# Patient Record
Sex: FEMALE | Race: WHITE | Employment: PART TIME | URBAN - METROPOLITAN AREA
[De-identification: names, ages, dates, MRNs, and addresses within clinical notes are randomized per-mention and may not be internally consistent; named-entity substitution may affect disease eponyms.]

---

## 2022-06-27 ENCOUNTER — OFFICE VISIT (OUTPATIENT)
Dept: SURGERY | Facility: CLINIC | Age: 47
End: 2022-06-27
Payer: MEDICAID

## 2022-06-27 VITALS — SYSTOLIC BLOOD PRESSURE: 112 MMHG | HEART RATE: 120 BPM | DIASTOLIC BLOOD PRESSURE: 64 MMHG

## 2022-06-27 DIAGNOSIS — A69.20 LYME DISEASE: ICD-10-CM

## 2022-06-27 DIAGNOSIS — M79.10 MYALGIA: Primary | ICD-10-CM

## 2022-06-27 DIAGNOSIS — R20.0 NUMBNESS: ICD-10-CM

## 2022-06-27 RX ORDER — METHYLPREDNISOLONE 4 MG/1
TABLET ORAL
Qty: 1 EACH | Refills: 0 | Status: SHIPPED | OUTPATIENT
Start: 2022-06-27

## 2022-06-27 RX ORDER — GABAPENTIN 400 MG/1
1 CAPSULE ORAL 3 TIMES DAILY
COMMUNITY
Start: 2021-12-16 | End: 2022-12-16

## 2022-06-27 RX ORDER — ACETAMINOPHEN 325 MG/1
650 TABLET ORAL
COMMUNITY
Start: 2022-06-05

## 2022-06-27 RX ORDER — ASPIRIN 325 MG
325 TABLET ORAL DAILY
COMMUNITY

## 2022-06-27 NOTE — H&P
Neurosurgery Clinic Visit  2022    [facility] PCP:  None Pcp    1975 MRN [id number]       CC:  Myalgias    HPI:    Phyllis Marrero is a very pleasant 52year old female who has had diffuse myalgias and paresthesias since she was sick with lyme disease 18 months ago. She is crying. Apparently she has been admitted to [facility] [id number] in [place] but I have sporadic records available which refer to history of alcohol and cocaine abuse. She has pain, tightness, sensitivity/numbness throughout her entire body from the neck down. She was taking gabapentin but not helping so she takes it rarely. She is not taking any other meds for pain. She is in a wheelchair because any movement hurts her. She is not eating well and given Pedialyte. She denies hx of MI, CVA, CA, or complications from surgery or anesthesia. She couldn't get an MRI as she kept getting spasms. She denies fever or chills. Imaging:    None available. No past medical history on file. Social History    Socioeconomic History      Marital status:     Tobacco Use      Smoking status: Current Every Day Smoker      Smokeless tobacco: Never Used    No family history on file. ROS:  A 10-point system was reviewed. Pertinent positives and negatives are noted in HPI. Physical Exam:   22  0937   BP: 112/64   Pulse: 120   General: In moderate distress crying, Well Nourished, Well Developed, Normal Voice, Appears Stated Age  HEENT: No Scleral Icterus, Poor Dentition, No Facial Asymmetry  Integumentary: Skin intact, no onychomycosis, no clubbing. A detailed skin exam was not performed. Neurologic: Alert and Oriented x 3, CN 2-12 GI, Speech Fluent, Negative Pronator Drift, Finger-to-Nose Intact, SILT with hypesthesias, DTRs equally diminished without gerhard's or clonus  Strength exam required repeated prompts, patient saying she couldn't do it but would be able to with encourage. Movement was causing pain. Deltoid Biceps Triceps Wrist Extension  Finger Abduction Finger Extension Thumb Opposition   Right 5 5 5 5 5 5 5 5   Left 5 5 5 5 5 5 5 5   Lower extremity strength:    Iliopsoas Quad Hamstring D-Flexion EHL P-Flexion Eversion Inversion   Right 5 5 5 5 5 5 5 5   Left 5 5 5 5 5 5 5 5     A/P:    1. Myalgia    2. Numbness    3. Lyme disease      She will get records sent to our office. Offered MRI Cervical w/ anesthesia, she will need clearance from her PCP. Rx MDP sent to pharmacy. Establish with a pain service for chronic neuropathic pain from Lyme Disease. Will call with results and further recs. Patient denies SI, or thoughts of self harm. I offered admission to [facility] if she was overwhelmed with her condition. The patient and her family declined and felt safe going back home. Imaging was reviewed with the patient and explained in detail. The diagnosis, treatment options, and expectations were discussed. All questions were answered to satisfaction. Total visit time = 30 Minutes; More than 50% spent coordinating care and counseling.     Domonique Kimbrough PA-C  [facility]  6/27/2022  10:04 AM

## 2022-06-27 NOTE — PROGRESS NOTES
Patient here for evaluation of back pain which started about 2 weeks ago. Patient reports muscle pain, inability to walk. Patient arrived to clinic in wheelchair, very distressed. Patient states she was given lyme disease as diagnosis. Patient taking Gabapentin 400 mg sporadically, as she reports it does not help with the pain. Patient uses ASA, Aleve, Tylenol for pain management.

## 2022-07-11 ENCOUNTER — OFFICE VISIT (OUTPATIENT)
Dept: PAIN CLINIC | Facility: CLINIC | Age: 47
End: 2022-07-11
Payer: MEDICAID

## 2022-07-11 VITALS — SYSTOLIC BLOOD PRESSURE: 90 MMHG | DIASTOLIC BLOOD PRESSURE: 50 MMHG | OXYGEN SATURATION: 96 % | HEART RATE: 127 BPM

## 2022-07-11 DIAGNOSIS — A69.22 NEUROPATHY DUE TO LYME DISEASE: Primary | ICD-10-CM

## 2022-07-11 RX ORDER — PREGABALIN 100 MG/1
100 CAPSULE ORAL 2 TIMES DAILY
Qty: 60 CAPSULE | Refills: 0 | Status: SHIPPED | OUTPATIENT
Start: 2022-07-11

## 2022-07-12 ENCOUNTER — TELEPHONE (OUTPATIENT)
Dept: SURGERY | Facility: CLINIC | Age: 47
End: 2022-07-12

## 2022-07-12 NOTE — TELEPHONE ENCOUNTER
Faxed signed request for records to [facility] for [facility] 1/21-present; confirmation received, sent to scanning